# Patient Record
Sex: MALE | Race: WHITE | NOT HISPANIC OR LATINO | URBAN - METROPOLITAN AREA
[De-identification: names, ages, dates, MRNs, and addresses within clinical notes are randomized per-mention and may not be internally consistent; named-entity substitution may affect disease eponyms.]

---

## 2024-07-31 ENCOUNTER — OFFICE VISIT (OUTPATIENT)
Dept: NEUROLOGY | Facility: MEDICAL CENTER | Age: 59
End: 2024-07-31
Attending: INTERNAL MEDICINE
Payer: COMMERCIAL

## 2024-07-31 VITALS
BODY MASS INDEX: 23.89 KG/M2 | DIASTOLIC BLOOD PRESSURE: 62 MMHG | OXYGEN SATURATION: 98 % | WEIGHT: 170.64 LBS | HEART RATE: 64 BPM | SYSTOLIC BLOOD PRESSURE: 104 MMHG | TEMPERATURE: 96.9 F | HEIGHT: 71 IN

## 2024-07-31 DIAGNOSIS — G20.A1 PARKINSON'S DISEASE WITHOUT DYSKINESIA OR FLUCTUATING MANIFESTATIONS (HCC): ICD-10-CM

## 2024-07-31 PROCEDURE — 3078F DIAST BP <80 MM HG: CPT | Performed by: INTERNAL MEDICINE

## 2024-07-31 PROCEDURE — 99202 OFFICE O/P NEW SF 15 MIN: CPT | Performed by: INTERNAL MEDICINE

## 2024-07-31 PROCEDURE — 99205 OFFICE O/P NEW HI 60 MIN: CPT | Performed by: INTERNAL MEDICINE

## 2024-07-31 PROCEDURE — 3074F SYST BP LT 130 MM HG: CPT | Performed by: INTERNAL MEDICINE

## 2024-07-31 RX ORDER — ROPINIROLE 0.25 MG/1
0.25 TABLET, FILM COATED ORAL 3 TIMES DAILY
Qty: 270 TABLET | Refills: 3 | Status: SHIPPED | OUTPATIENT
Start: 2024-07-31 | End: 2025-07-26

## 2024-07-31 RX ORDER — BLOOD-GLUCOSE SENSOR
1 EACH MISCELLANEOUS
COMMUNITY
Start: 2024-07-11

## 2024-07-31 RX ORDER — CARBIDOPA AND LEVODOPA 25; 100 MG/1; MG/1
1 TABLET ORAL 3 TIMES DAILY
COMMUNITY
Start: 2024-06-21 | End: 2025-06-21

## 2024-07-31 ASSESSMENT — PATIENT HEALTH QUESTIONNAIRE - PHQ9: CLINICAL INTERPRETATION OF PHQ2 SCORE: 0

## 2024-07-31 NOTE — PROGRESS NOTES
[facility]  [address] [place] [address], [address]. [place] [address]  Phone: [phone], Fax: [phone]    Pepe Leonardo DO  Neurology, Movement Disorders Patient Name: Christian Miller  : 1965  MRN: [id number]     ASSESSMENT / PLAN   Christian Miller is a 59 y.o. RHD male presenting for parkinsonism    Parkinsonism  Onset approx  with left arm tremors and dx in . Will start dopamine agonists as symptoms are slightly bothersome. Consider levodopa if not tolerated or not effective.    - Ropinirole 0.25mg tablets: 1 tablet, 3x/day (approx every 5 hours)  Can increase dose if needed: 2 tablets, 3x/day. Message me if needing updated prescription.  - lifestyle changes discussed  - PSCNN discussed    Orders Placed This Encounter    carbidopa-levodopa (SINEMET)  MG Tab    metformin (GLUCOPHAGE) 1000 MG tablet    Continuous Glucose Sensor (FREESTYLE CHELY 3 SENSOR) Misc    ROPINIRole (REQUIP) 0.25 MG Tab     Return in about 3 months (around 10/31/2024).    BILLING DOCUMENTATION:   Excluding time spent on procedures during visit, I spent 60 minutes reviewing the medical record, interviewing and examining the patient, discussing diagnosis and treatment, and coordinating care.                HISTORY OF PRESENT ILLNESS   Christian Miller is a 59 y.o. RHD male presenting for parkinsonism    Initial HPI 24  Dr. Whalen at [facility] 2024 dx with parkinson's and started him on carbidopa/levodopa.    Onset: left arm tremor and rigidity approx, . Tremors are suppressible, not too bothersome. Right hand no issues with handwriting or dexterity.     Lost 10lb in 6m. Overall strength change. Not able to lift as much weight as compared to the past 15 years.     A1c: 6.8 and initially thought tremors is due to diabetes.     Neuroleptics/pesticide exposure: none  Family history: none. Father had stroke. Grandfather had ALS.  Imaging: none    Current Regimen  none  Latency:   Duration:  "  Efficacy:  Dyskinesia/Dystonia:   Sfx:    ROS:  Gait: no issues. Exercising regularly, which seems to help with stiffness.   Orthostasis: no issues, rarely  Constipation: no issues  Urinary issues: no issues  Speech/Swallow: mild decrease in articulotion. No dysphagia  Anosmia: before 2020 at least  Cognition: no issues  Mood: challenged with diagnosis at first. Overall, not anxious or depressed  Hallucinations: no issues  Sleep/RBD: sleeping well. Wakes up 1-2x/night.       No past medical history on file.  No past surgical history on file.  No family history on file.  Social History     Socioeconomic History    Marital status:      Spouse name: Not on file    Number of children: Not on file    Years of education: Not on file    Highest education level: Not on file   Occupational History    Not on file   Tobacco Use    Smoking status: Never    Smokeless tobacco: Never   Vaping Use    Vaping status: Never Used   Substance and Sexual Activity    Alcohol use: Yes     Comment: occ    Drug use: Not on file    Sexual activity: Not on file   Other Topics Concern    Not on file   Social History Narrative    Not on file     Social Determinants of Health     Financial Resource Strain: Not on file   Food Insecurity: Not on file   Transportation Needs: Not on file   Physical Activity: Not on file   Stress: Not on file   Social Connections: Not on file   Intimate Partner Violence: Not on file   Housing Stability: Not on file     Current Outpatient Medications   Medication    carbidopa-levodopa (SINEMET)  MG Tab    metformin (GLUCOPHAGE) 1000 MG tablet    Continuous Glucose Sensor (FREESTYLE CHELY 3 SENSOR) Misc    ROPINIRole (REQUIP) 0.25 MG Tab     No current facility-administered medications for this visit.     Allergies   Allergen Reactions    Gluten Meal Nausea     Pt has Celiac's Disease             DATA / RESULTS     No results found for: \"25HYDROXY\", \"UPEEDGPC35\", \"TSHULTRASEN\", \"SPIFINTERP\", \"YFH357\", " "\"TRINA\", \"HBA1C\", \"LDL\"             OBJECTIVE      Vitals:    07/31/24 0750   BP: 104/62   BP Location: Left arm   Patient Position: Sitting   BP Cuff Size: Adult   Pulse: 64   Temp: 36.1 °C (96.9 °F)   TempSrc: Temporal   SpO2: 98%   Weight: 77.4 kg (170 lb 10.2 oz)   Height: 1.803 m (5' 11\")     Physical Exam     Last dose of C/L taken --     General: NAD, appears stated age.      Mental status: Speech clear and fluent without tremor. Fund of knowledge is good.      Cranial Nerves:  CN2: PERRL. Visual fields are full to finger confrontation.   CN3/4/6: EOMI. There is no nystagmus.  CN5: V1-V3 intact to light touch    CN7: Symmetric face.   CN8: Hearing grossly intact.   CN9/10/12: Soft palate and uvula rise symmetrically. Tongue midline.   CN11: Shoulder shrug intact bilaterally.     Strength  Right Left   Shoulder Abduction  5 5   Elbow Flexion 5 5   Elbow Extension  5 5   Wrist Extension  5 5   Finger Extension  5 5   Hip Flexion  5 5   Knee Extension 5 5   Ankle Dorsiflexion  5 5     Deep Tendon Reflexes: 2+ biceps, brachioradialis, patella and ankles. Plantar reflexes in flexion.    Abnormal movements:    UPDRS Right Left   Finger tapping 0 1   Hand Movement 0 0   Toe Tapping 0 1   Leg Agility 0 1   Rigidity 0 2   Rest Tremor 0 1   Postural Tremor 0 1   Kinetic Tremor 0 1      No dystonia, dyskinesias, tics, stereotypies, athetosis, akathisia, or chorea noted.      Sensory: normal to sharp, temperature.    Quantitative tuning fork Right Left   Hands 8 8   Feet 8 8       Cerebellar: No dysmetria with FTN or heel-to-shin.    Gait:   Posture - normal   Base - narrow   Stride length - normal   Arm swing - decreased LEFT arm swing   Speed - normal  Shuffling/freezing - none  U-Turn - normal   Romberg - normal   Heel, toe, and tandem - normal             PROCEDURE   N/A  "

## 2024-07-31 NOTE — PATIENT INSTRUCTIONS
Ropinirole 0.25mg tablets: 1 tablet, 3x/day (approx every 5 hours)  Can increase dose if needed: 2 tablets, 3x/day. Message me if needing updated prescription.      [facility]  [email or web address]  [phone]     Informational Resources  [employer] - www.[employer] Waves.[employer]  [facility] - www.[facility].org  [employer] Parkinsons Foundation - www.parkinson.org  [employer] - www.braingrant.org    Therapy  LSVT BIG (Physical) and LOUD (Speech) Therapy: www.lsvtglobal.Izooble    Tools  [employer] - make-up line designed by a person with PD for easier use  Liftware - utensils for use with tremors     NUSHU walking shoes for Parkinson's: www.[employer].[place].net/shop/nushu-x    Online exercise classes  med.[place].[facility]/parkinsons/living-with-PD/exercise-videos.html    A selected few from that list:  www.parkinsonseurope.org/exercisecast/  videos.aarp.org/search?q=exercise  www.Dev[employer]/videos/  www.Plaid.com/c/APDAGreaterStLouisChapter  www.[employer].org/youtube     [facility] [facility]    [place] [facility] ([facility]) is a first-of-its-kind, [facility]   nonprofit gym founded in 2006 to provide an effective form of physical   exercise to people who are living with Parkinson’s. Though it may seem   surprising, this non-contact boxing-inspired fitness routine is   dramatically improving the ability of people with Parkinson’s to live   independent lives. [facility] was founded by former [facility] prosecutor Gorge Vee, who was diagnosed with   Parkinson’s disease at age 38.     Various studies in the 1980s and 1990s supported the notion that   rigorous exercise, emphasizing gross motor movement, balance, core   strength, and rhythm, could favorably impact range of motion,   flexibility, posture, gait, and activities of daily living. More recent   studies, most notably at [facility], focus on the concept    of intense “forced” exercise, and have begun to suggest that certain   kinds of exercise may be neuro-protective, i.e., actually slowing disease   progression.     [employer] enables people with Parkinson’s disease to fight their disease by   providing non-contact boxing-style fitness programs that improve their   quality of life and sense of efficacy and self-worth. Recent studies also   suggest that intense exercise programs may be “neuro-protective,”   actually working to delay the progression of symptoms. [employer] provides   encouragement through a “tough love” approach, inspiring maximum   effort, speed, strength, balance, and flexibility. Boxing works by moving   your body in all planes of motion while continuously changing the   routine as you progress through the workout. [employer] classes have proven   that anyone at any level of Parkinson’s can actually lessen their   symptoms and lead a healthier/happier life.     1. Classes: We offer two different levels of classes to accommodate   varying degrees of Parkinson’s and fitness levels.   2. Camaraderie: Friends for fighters and caregivers. (aka cornermen)   3. Hope: We empower you to Fight Back against Parkinson’s disease.    www.[email or web address]/robert   [phone]  [email or web address]  [facility]  [address], [place]  11:00 am, Monday - Friday Monday/Wednesday/Friday classes: intermediate pace/level   Tuesday/Thursday: beginner/slower paced level    www.[place].comub.[employer]  [place] [phone], [place] [phone]  [facility]  [address], [facility] Steady Boxing - Tuesday/Thursday at 3:00pm-4:00pm  Low Impact/Seated Boxing - Tues/Thurs at 2:00pm-2:50pm   [facility]  [address] [place] [address] Steady Boxing - Monday/Wednesday/Friday at 3:00pm-4:00pm  Senior Work Out - Mon/Fri at 4:00pm-5:00pm

## 2024-10-21 ENCOUNTER — APPOINTMENT (OUTPATIENT)
Dept: NEUROLOGY | Facility: MEDICAL CENTER | Age: 59
End: 2024-10-21
Attending: INTERNAL MEDICINE
Payer: COMMERCIAL